# Patient Record
Sex: MALE | Race: WHITE | URBAN - METROPOLITAN AREA
[De-identification: names, ages, dates, MRNs, and addresses within clinical notes are randomized per-mention and may not be internally consistent; named-entity substitution may affect disease eponyms.]

---

## 2018-09-19 ENCOUNTER — OFFICE VISIT (OUTPATIENT)
Dept: FAMILY MEDICINE CLINIC | Age: 68
End: 2018-09-19

## 2018-09-19 ENCOUNTER — HOSPITAL ENCOUNTER (OUTPATIENT)
Dept: LAB | Age: 68
Discharge: HOME | End: 2018-09-19
Payer: MEDICARE

## 2018-09-19 VITALS
SYSTOLIC BLOOD PRESSURE: 128 MMHG | DIASTOLIC BLOOD PRESSURE: 86 MMHG | WEIGHT: 219.4 LBS | RESPIRATION RATE: 12 BRPM | BODY MASS INDEX: 33.25 KG/M2 | HEART RATE: 75 BPM | OXYGEN SATURATION: 97 % | TEMPERATURE: 97.6 F | HEIGHT: 68 IN

## 2018-09-19 DIAGNOSIS — E87.5 HYPERKALEMIA: ICD-10-CM

## 2018-09-19 DIAGNOSIS — Z79.899 HIGH RISK MEDICATION USE: ICD-10-CM

## 2018-09-19 DIAGNOSIS — L40.50 PSORIATIC ARTHRITIS (HCC): Primary | ICD-10-CM

## 2018-09-19 DIAGNOSIS — Z88.9 HISTORY OF SEASONAL ALLERGIES: ICD-10-CM

## 2018-09-19 DIAGNOSIS — G47.33 OSA ON CPAP: ICD-10-CM

## 2018-09-19 DIAGNOSIS — Z99.89 OSA ON CPAP: ICD-10-CM

## 2018-09-19 DIAGNOSIS — I48.20 CHRONIC ATRIAL FIBRILLATION (HCC): ICD-10-CM

## 2018-09-19 DIAGNOSIS — Z23 ENCOUNTER FOR IMMUNIZATION: ICD-10-CM

## 2018-09-19 DIAGNOSIS — M27.69 FAILING DENTAL IMPLANT: ICD-10-CM

## 2018-09-19 DIAGNOSIS — Z13.5 GLAUCOMA SCREENING: ICD-10-CM

## 2018-09-19 DIAGNOSIS — L40.50 PSORIATIC ARTHRITIS (HCC): ICD-10-CM

## 2018-09-19 LAB
ALBUMIN SERPL-MCNC: 4.1 G/DL (ref 3.4–5)
ALBUMIN/GLOB SERPL: 1.2 {RATIO} (ref 0.8–1.7)
ALP SERPL-CCNC: 41 U/L (ref 45–117)
ALT SERPL-CCNC: 35 U/L (ref 16–61)
ANION GAP SERPL CALC-SCNC: 8 MMOL/L (ref 3–18)
AST SERPL-CCNC: 20 U/L (ref 15–37)
BASOPHILS # BLD: 0 K/UL (ref 0–0.1)
BASOPHILS NFR BLD: 0 % (ref 0–2)
BILIRUB SERPL-MCNC: 0.5 MG/DL (ref 0.2–1)
BUN SERPL-MCNC: 21 MG/DL (ref 7–18)
BUN/CREAT SERPL: 20 (ref 12–20)
CALCIUM SERPL-MCNC: 9.3 MG/DL (ref 8.5–10.1)
CHLORIDE SERPL-SCNC: 106 MMOL/L (ref 100–108)
CO2 SERPL-SCNC: 27 MMOL/L (ref 21–32)
CREAT SERPL-MCNC: 1.04 MG/DL (ref 0.6–1.3)
DIFFERENTIAL METHOD BLD: ABNORMAL
EOSINOPHIL # BLD: 0.2 K/UL (ref 0–0.4)
EOSINOPHIL NFR BLD: 3 % (ref 0–5)
ERYTHROCYTE [DISTWIDTH] IN BLOOD BY AUTOMATED COUNT: 12.9 % (ref 11.6–14.5)
GLOBULIN SER CALC-MCNC: 3.4 G/DL (ref 2–4)
GLUCOSE SERPL-MCNC: 86 MG/DL (ref 74–99)
HCT VFR BLD AUTO: 45.1 % (ref 36–48)
HGB BLD-MCNC: 15.5 G/DL (ref 13–16)
LYMPHOCYTES # BLD: 1.8 K/UL (ref 0.9–3.6)
LYMPHOCYTES NFR BLD: 27 % (ref 21–52)
MCH RBC QN AUTO: 34.4 PG (ref 24–34)
MCHC RBC AUTO-ENTMCNC: 34.4 G/DL (ref 31–37)
MCV RBC AUTO: 100.2 FL (ref 74–97)
MONOCYTES # BLD: 1 K/UL (ref 0.05–1.2)
MONOCYTES NFR BLD: 15 % (ref 3–10)
NEUTS SEG # BLD: 3.8 K/UL (ref 1.8–8)
NEUTS SEG NFR BLD: 55 % (ref 40–73)
PLATELET # BLD AUTO: 197 K/UL (ref 135–420)
PMV BLD AUTO: 11.7 FL (ref 9.2–11.8)
POTASSIUM SERPL-SCNC: 4.6 MMOL/L (ref 3.5–5.5)
PROT SERPL-MCNC: 7.5 G/DL (ref 6.4–8.2)
RBC # BLD AUTO: 4.5 M/UL (ref 4.7–5.5)
SODIUM SERPL-SCNC: 141 MMOL/L (ref 136–145)
WBC # BLD AUTO: 6.8 K/UL (ref 4.6–13.2)

## 2018-09-19 PROCEDURE — 85025 COMPLETE CBC W/AUTO DIFF WBC: CPT | Performed by: FAMILY MEDICINE

## 2018-09-19 PROCEDURE — 80053 COMPREHEN METABOLIC PANEL: CPT | Performed by: FAMILY MEDICINE

## 2018-09-19 RX ORDER — MELOXICAM 7.5 MG/1
7.5 TABLET ORAL DAILY
COMMUNITY
Start: 2018-08-23

## 2018-09-19 RX ORDER — CHLORHEXIDINE GLUCONATE 1.2 MG/ML
RINSE BUCCAL
COMMUNITY
Start: 2018-07-12

## 2018-09-19 RX ORDER — LORATADINE 10 MG/1
10 TABLET ORAL DAILY
COMMUNITY

## 2018-09-19 RX ORDER — CARVEDILOL 12.5 MG/1
12.5 TABLET ORAL 2 TIMES DAILY WITH MEALS
COMMUNITY
Start: 2018-07-27

## 2018-09-19 RX ORDER — FLUTICASONE PROPIONATE 50 MCG
SPRAY, SUSPENSION (ML) NASAL
COMMUNITY
Start: 2018-09-08

## 2018-09-19 RX ORDER — MULTIVITAMIN
1 TABLET ORAL DAILY
COMMUNITY

## 2018-09-19 RX ORDER — DABIGATRAN ETEXILATE MESYLATE 150 MG/1
150 CAPSULE ORAL 2 TIMES DAILY
COMMUNITY
Start: 2018-08-23

## 2018-09-19 NOTE — PROGRESS NOTES
HISTORY OF PRESENT ILLNESS 
Sugey Higuera is a 74 y.o. male. HPI Comments: Mr. Saskia Finch is here to establish care because he just turned 74, got Medicare, and got kicked out of the [employer] system. He has psoriatic arthritis, a-fib and seasonal allergies. He was also seeing derm. He also had a dental implant months ago that isn't healing and the dentist asked him to get Vitamin D and calcium checked. When the rheumatologist did labs, his K+ was high, she asked him to get a renal panel when he gets a new PCP. He signed to get his records sent here. Labs Pertinent negatives include no chest pain, no abdominal pain, no headaches and no shortness of breath. Review of Systems Constitutional: Negative for chills and fever. HENT: Negative for hearing loss and sore throat. Eyes: Negative for blurred vision and double vision. Respiratory: Negative for cough and shortness of breath. Cardiovascular: Negative for chest pain and palpitations. Gastrointestinal: Negative for abdominal pain, nausea and vomiting. Genitourinary: Negative for dysuria, frequency and urgency. Musculoskeletal: Positive for myalgias. Negative for neck pain. Neurological: Negative for headaches. Psychiatric/Behavioral: Negative for depression and suicidal ideas. Physical Exam  
Constitutional: He is oriented to person, place, and time. He appears well-developed and well-nourished. Eyes: Pupils are equal, round, and reactive to light. Neck: Neck supple. No thyromegaly present. Cardiovascular: Normal rate and normal heart sounds. Pulmonary/Chest: Effort normal and breath sounds normal. No respiratory distress. He has no wheezes. He has no rales. Abdominal: Soft. He exhibits no distension. There is no tenderness. Lymphadenopathy:  
  He has no cervical adenopathy. Neurological: He is alert and oriented to person, place, and time. Psychiatric: He has a normal mood and affect. His behavior is normal.  
Nursing note and vitals reviewed. ASSESSMENT and PLAN 
  ICD-10-CM ICD-9-CM 1. Psoriatic arthritis ([facility] L40.50 696.0 CBC WITH AUTOMATED DIFF 2. Hyperkalemia S03.7 643.2 METABOLIC PANEL, COMPREHENSIVE 3. Chronic atrial fibrillation (HCC) I48.2 427.31   
4. History of seasonal allergies Z88.9 V15.09   
5. High risk medication use T42.099 X45.88 METABOLIC PANEL, COMPREHENSIVE  
   CBC WITH AUTOMATED DIFF 6. DOMINIC on CPAP G47.33 327.23   
 Z99.89 V46.8 7. Encounter for immunization Z23 V03.89 INFLUENZA VACCINE INACTIVATED (IIV), SUBUNIT, ADJUVANTED, IM  
   ADMIN INFLUENZA VIRUS VAC 8. Failing dental implant M27.69 525.79   
9. Glaucoma screening Z13.5 V80.1 REFERRAL TO OPTOMETRY

## 2018-09-19 NOTE — MR AVS SNAPSHOT
[address] [place] [address] [id number] 
[phone] Patient: Jose Medel MRN: [id number] WKN:[id number] Visit Information Date & Time Provider Department Dept. Phone Encounter #  
 9/19/2018  1:00 PM Akosua Hawk, [address] [facility] [phone] [id number] Upcoming Health Maintenance Date Due Hepatitis C Screening 1950 DTaP/Tdap/Td series (1 - Tdap) 9/30/1973 FOBT Q 1 YEAR AGE 50-75 9/30/2002 ZOSTER VACCINE AGE 60> 7/30/2012 GLAUCOMA SCREENING Q2Y 9/30/2017 Pneumococcal 65+ Low/Medium Risk (1 of 2 - PCV13) 9/30/2017 Influenza Age 5 to Adult 8/1/2018 Allergies as of 9/19/2018  Review Complete On: 9/19/2018 By: Akosua Hawk MD  
 No Known Allergies Current Immunizations  Never Reviewed Name Date Influenza Vaccine (Tri) Adjuvanted 9/19/2018 Not reviewed this visit You Were Diagnosed With   
  
 Codes Comments Psoriatic arthritis ([facility]    -  Primary ICD-10-CM: L40.50 ICD-9-CM: 696.0 Hyperkalemia     ICD-10-CM: E87.5 ICD-9-CM: 276.7 Chronic atrial fibrillation (HCC)     ICD-10-CM: Q37.9 ICD-9-CM: 427.31 History of seasonal allergies     ICD-10-CM: Z88.9 ICD-9-CM: V15.09 High risk medication use     ICD-10-CM: Z79.899 ICD-9-CM: V58.69 DOMINIC on CPAP     ICD-10-CM: G47.33, Z99.89 ICD-9-CM: 327.23, V46.8 Encounter for immunization     ICD-10-CM: B05 ICD-9-CM: V03.89 Failing dental implant     ICD-10-CM: M27.69 ICD-9-CM: 525.79 Vitals BP Pulse Temp Resp Height(growth percentile) Weight(growth percentile) 128/86 (BP 1 Location: Right arm, BP Patient Position: Sitting) 75 97.6 °F (36.4 °C) (Oral) 12 5' 8\" (1.727 m) 219 lb 6.4 oz (99.5 kg) SpO2 BMI Smoking Status 97% 33.36 kg/m2 Never Smoker BMI and BSA Data Body Mass Index Body Surface Area  
 33.36 kg/m 2 2.18 m 2 Preferred Pharmacy Pharmacy Name Phone [facility] [phone] Your Updated Medication List  
  
   
This list is accurate as of 9/19/18  1:31 PM.  Always use your most recent med list.  
  
  
  
  
 adalimumab 40 mg/0.8 mL injection pen Commonly known as:  HUMIRA  
by SubCUTAneous route once. calcium-cholecalciferol (D3) tablet Commonly known as:  CALTRATE 600+D Take 1 Tab by mouth daily. carvedilol 12.5 mg tablet Commonly known as:  COREG  
  
 CLARITIN 10 mg tablet Generic drug:  loratadine Take 10 mg by mouth. CLOBETASOL PROPIONATE (BULK)  
by Does Not Apply route. fluticasone 50 mcg/actuation nasal spray Commonly known as:  FLONASE  
  
 meloxicam 7.5 mg tablet Commonly known as:  MOBIC PERIOGARD 0.12 % solution Generic drug:  chlorhexidine PRADAXA 150 mg capsule Generic drug:  dabigatran etexilate We Performed the Following ADMIN INFLUENZA VIRUS VAC [ [facility]] INFLUENZA VACCINE INACTIVATED (IIV), SUBUNIT, ADJUVANTED, IM [id number] CPT(R)] Patient Instructions Follow up on lab results in 1-2 days. If you sign up for \"My Chart\" you will be able to see the results online, and if you have any questions you can send me an e-mail. Schedule a \"Welcome to Medicare Exam\" and get together dates of your vaccines, colonoscopies, and if you have an Advanced Medical Directive (living will), bring that in. Introducing [facility]! Nehemias Minaya introduces [employer] patient portal. Now you can access parts of your medical record, email your doctor's office, and request medication refills online. 1. In your internet browser, go to [email or web address] 2. Click on the First Time User? Click Here link in the Sign In box. You will see the New Member Sign Up page. 3. Enter your [employer] Access Code exactly as it appears below.  You will not need to use this code after youve completed the sign-up process. If you do not sign up before the expiration date, you must request a new code. · [email or web address] Access Code: [email or web address] Expires: 12/18/2018  1:31 PM 
 
4. Enter the last four digits of your Social Security Number (xxxx) and Date of Birth (mm/dd/yyyy) as indicated and click Submit. You will be taken to the next sign-up page. 5. Create a [employer] ID. This will be your [employer] login ID and cannot be changed, so think of one that is secure and easy to remember. 6. Create a [employer] password. You can change your password at any time. 7. Enter your Password Reset Question and Answer. This can be used at a later time if you forget your password. 8. Enter your e-mail address. You will receive e-mail notification when new information is available in [address]. 9. Click Sign Up. You can now view and download portions of your medical record. 10. Click the Download Summary menu link to download a portable copy of your medical information. If you have questions, please visit the Frequently Asked Questions section of the [employer] website. Remember, [employer] is NOT to be used for urgent needs. For medical emergencies, dial 911. Now available from your iPhone and Android! Please provide this summary of care documentation to your next provider. If you have any questions after today's visit, please call [phone].

## 2018-09-19 NOTE — PROGRESS NOTES
Rm 1 
Patient presents to the clinic for a renal panel lab work due to having a low potassium level a couple months ago and to establish care. Depression Screening: PHQ over the last two weeks 9/19/2018 Little interest or pleasure in doing things Not at all Feeling down, depressed, irritable, or hopeless Not at all Total Score PHQ 2 0 Learning Assessment: 
Learning Assessment 9/19/2018 PRIMARY LEARNER Patient HIGHEST LEVEL OF EDUCATION - PRIMARY LEARNER  4 YEARS OF COLLEGE  
BARRIERS PRIMARY LEARNER NONE  
CO-LEARNER CAREGIVER No  
PRIMARY LANGUAGE ENGLISH  
LEARNER PREFERENCE PRIMARY DEMONSTRATION  
ANSWERED BY pTIENT  
RELATIONSHIP SELF Abuse Screening: 
Abuse Screening Questionnaire 9/19/2018 Do you ever feel afraid of your partner? Vik Gonsalez Are you in a relationship with someone who physically or mentally threatens you? Vik Gonsalez Is it safe for you to go home? Melchor Pacheco Health Maintenance reviewed and discussed per provider: yes Coordination of Care: 1. Have you been to the ER, urgent care clinic since your last visit? Hospitalized since your last visit? no 
 
2. Have you seen or consulted any other health care providers outside of the [address] [place] [address] since your last visit? Include any pap smears or colon screening. no 
 
 
 
 
Patient received influenza vaccine 0.5ml in left deltoid. Tolerated well. No signs or symptoms of distress noted. Most current VIS given and consent signed.

## 2018-09-21 ENCOUNTER — TELEPHONE (OUTPATIENT)
Dept: FAMILY MEDICINE CLINIC | Age: 68
End: 2018-09-21

## 2018-09-21 NOTE — PROGRESS NOTES
Patient called back, let him know his lab results were in and looks like everything came back great. Patient verbalized understanding and had no further questions.

## 2018-09-21 NOTE — TELEPHONE ENCOUNTER
Patient called, verified name and  to return phone call. Let patient know we received his lab work and it looks like everything came back great. Patient verbalized understanding and had no further questions.

## 2018-10-24 ENCOUNTER — OFFICE VISIT (OUTPATIENT)
Dept: FAMILY MEDICINE CLINIC | Age: 68
End: 2018-10-24

## 2018-10-24 VITALS
DIASTOLIC BLOOD PRESSURE: 84 MMHG | TEMPERATURE: 97.9 F | HEART RATE: 76 BPM | SYSTOLIC BLOOD PRESSURE: 125 MMHG | WEIGHT: 219.6 LBS | BODY MASS INDEX: 33.28 KG/M2 | OXYGEN SATURATION: 98 % | HEIGHT: 68 IN | RESPIRATION RATE: 16 BRPM

## 2018-10-24 DIAGNOSIS — Z23 ENCOUNTER FOR IMMUNIZATION: ICD-10-CM

## 2018-10-24 DIAGNOSIS — Z00.00 INITIAL MEDICARE ANNUAL WELLNESS VISIT: Primary | ICD-10-CM

## 2018-10-24 DIAGNOSIS — Z13.31 SCREENING FOR DEPRESSION: ICD-10-CM

## 2018-10-24 DIAGNOSIS — Z13.39 SCREENING FOR ALCOHOLISM: ICD-10-CM

## 2018-10-24 DIAGNOSIS — Z71.89 ADVANCED CARE PLANNING/COUNSELING DISCUSSION: ICD-10-CM

## 2018-10-24 NOTE — PROGRESS NOTES
This is an Initial Medicare Annual Wellness Exam (AWV) (Performed 12 months after IPPE or effective date of Medicare Part B enrollment, Once in a lifetime) I have reviewed the patient's medical history in detail and updated the computerized patient record. History Past Medical History:  
Diagnosis Date  Atrial fibrillation ([facility] 2001  Psoriatic arthritis ([facility]  Sleep apnea 2001 No past surgical history on file. Current Outpatient Medications Medication Sig Dispense Refill  carvedilol (COREG) 12.5 mg tablet  fluticasone (FLONASE) 50 mcg/actuation nasal spray  meloxicam (MOBIC) 7.5 mg tablet  PERIOGARD 0.12 % solution  PRADAXA 150 mg capsule  adalimumab (HUMIRA) 40 mg/0.8 mL injection pen by SubCUTAneous route once.  loratadine (CLARITIN) 10 mg tablet Take 10 mg by mouth.  calcium-cholecalciferol, D3, (CALTRATE 600+D) tablet Take 1 Tab by mouth daily.  CLOBETASOL PROPIONATE, BULK, by Does Not Apply route. No Known Allergies Family History Problem Relation Age of Onset  No Known Problems Mother  No Known Problems Father Social History Tobacco Use  Smoking status: Never Smoker  Smokeless tobacco: Never Used Substance Use Topics  Alcohol use: No  
 
Patient Active Problem List  
Diagnosis Code  Psoriatic arthritis ([facility] L40.50  Chronic atrial fibrillation (HCC) I48.2  DOMINIC on CPAP G47.33, Z99.89 Depression Risk Factor Screening: PHQ over the last two weeks 10/24/2018 Little interest or pleasure in doing things Not at all Feeling down, depressed, irritable, or hopeless Not at all Total Score PHQ 2 0 Alcohol Risk Factor Screening: You do not drink alcohol or very rarely. Functional Ability and Level of Safety:  
 
Hearing Loss Hearing is good. Activities of Daily Living The home contains: handrails and grab bars Patient does total self care Fall Risk Fall Risk Assessment, last 12 mths 9/19/2018 Able to walk? Yes Fall in past 12 months? No  
 
 
Abuse Screen Patient is not abused Cognitive Screening Evaluation of Cognitive Function: 
Has your family/caregiver stated any concerns about your memory: no 
Normal 
 
Patient Care Team  
No care team member to display Assessment/Plan Education and counseling provided: 
End-of-Life planning (with patient's consent) Diagnoses and all orders for this visit: 
 
1. Initial Medicare annual wellness visit 2. Screening for alcoholism -     [place] ANNUAL ALCOHOL SCREEN 15 MIN 3. Screening for depression 
-     Jean  Health Maintenance Due Topic Date Due  
 Hepatitis C Screening  1950  DTaP/Tdap/Td series (1 - Tdap) 09/30/1973  Shingrix Vaccine Age 50> (1 of 2) 09/30/2002  FOBT Q 1 YEAR AGE 50-75  09/30/2002  GLAUCOMA SCREENING Q2Y  09/30/2017  Pneumococcal 65+ Low/Medium Risk (1 of 2 - PCV13) 09/30/2017  MEDICARE YEARLY EXAM  09/19/2018 His last colonoscopy was about 3 years ago, he's on the 5 year plan

## 2018-10-24 NOTE — PATIENT INSTRUCTIONS
Medicare Wellness Visit, Male The best way to live healthy is to have a lifestyle where you eat a well-balanced diet, exercise regularly, limit alcohol use, and quit all forms of tobacco/nicotine, if applicable. Regular preventive services are another way to keep healthy. Preventive services (vaccines, screening tests, monitoring & exams) can help personalize your care plan, which helps you manage your own care. Screening tests can find health problems at the earliest stages, when they are easiest to treat. [address] [facility] follows the current, evidence-based guidelines published by the [facility] ([facility]) when recommending preventive services for our patients. Because we follow these guidelines, sometimes recommendations change over time as research supports it. (For example, a prostate screening blood test is no longer routinely recommended for men with no symptoms.) Of course, you and your doctor may decide to screen more often for some diseases, based on your risk and co-morbidities (chronic disease you are already diagnosed with). Preventive services for you include: - Medicare offers their members a free annual wellness visit, which is time for you and your primary care provider to discuss and plan for your preventive service needs. Take advantage of this benefit every year! 
-All adults over age 72 should receive the recommended pneumonia vaccines. Current USPSTF guidelines recommend a series of two vaccines for the best pneumonia protection.  
-All adults should have a flu vaccine yearly and an ECG.  All adults age 61 and older should receive a shingles vaccine once in their lifetime.   
-All adults age 38-68 who are overweight should have a diabetes screening test once every three years.  
-Other screening tests & preventive services for persons with diabetes include: an eye exam to screen for diabetic retinopathy, a kidney function test, a foot exam, and stricter control over your cholesterol.  
-Cardiovascular screening for adults with routine risk involves an electrocardiogram (ECG) at intervals determined by the provider.  
-Colorectal cancer screening should be done for adults age 54-65 with no increased risk factors for colorectal cancer. There are a number of acceptable methods of screening for this type of cancer. Each test has its own benefits and drawbacks. Discuss with your provider what is most appropriate for you during your annual wellness visit. The different tests include: colonoscopy (considered the best screening method), a fecal occult blood test, a fecal DNA test, and sigmoidoscopy. 
-All adults born between [facility] should be screened once for Hepatitis C. 
-An Abdominal Aortic Aneurysm (AAA) Screening is recommended for men age 73-68 who has ever smoked in their lifetime. Here is a list of your current Health Maintenance items (your personalized list of preventive services) with a due date: 
Health Maintenance Due Topic Date Due  
 Hepatitis C Test  1950  DTaP/Tdap/Td  (1 - Tdap) 09/30/1973  Shingles Vaccine (1 of 2) 09/30/2002  Stool testing for trace blood  09/30/2002  Glaucoma Screening   09/30/2017  Pneumococcal Vaccine (1 of 2 - PCV13) 09/30/2017 [place] Annual Well Visit  09/19/2018 Go through your stack of records and try to find the date of the colonoscopy for me, and if you can the hepatitis C screen.

## 2018-10-24 NOTE — PROGRESS NOTES
Presented for Tdap and Pneumococcal Vaccine. Administered in left and right deltoid without complaints. Pt observed for 5 min. No adverse reaction noted. Pt left office in stable condition

## 2018-10-24 NOTE — ACP (ADVANCE CARE PLANNING)
Discussed the importance of having an Advanced Care plan in writing (and in the electronic medical record), and the time to do it is now, when one is able to make the necessary decisions. A copy of the Advance Medical Directive booklet was given to the patient.       He said this is something he has been meaning to get done, will try to before next visit

## 2018-10-24 NOTE — PROGRESS NOTES
Rm 2 
Patient presents to the clinic Chief Complaint Patient presents with  Welcome To Medicare Depression Screening: PHQ over the last two weeks 10/24/2018 9/19/2018 Little interest or pleasure in doing things Not at all Not at all Feeling down, depressed, irritable, or hopeless Not at all Not at all Total Score PHQ 2 0 0 Learning Assessment: 
Learning Assessment 9/19/2018 PRIMARY LEARNER Patient HIGHEST LEVEL OF EDUCATION - PRIMARY LEARNER  4 YEARS OF COLLEGE  
BARRIERS PRIMARY LEARNER NONE  
CO-LEARNER CAREGIVER No  
PRIMARY LANGUAGE ENGLISH  
LEARNER PREFERENCE PRIMARY DEMONSTRATION  
ANSWERED BY pTIENT  
RELATIONSHIP SELF Abuse Screening: 
Abuse Screening Questionnaire 9/19/2018 Do you ever feel afraid of your partner? Henretta Expose Are you in a relationship with someone who physically or mentally threatens you? Henretta Expose Is it safe for you to go home? Siddhartha Dakins Health Maintenance reviewed and discussed per provider: yes Coordination of Care: 1. Have you been to the ER, urgent care clinic since your last visit? Hospitalized since your last visit? no 
 
2. Have you seen or consulted any other health care providers outside of the [facility] since your last visit? Include any pap smears or colon screening.  no

## 2019-01-03 ENCOUNTER — OFFICE VISIT (OUTPATIENT)
Dept: FAMILY MEDICINE CLINIC | Age: 69
End: 2019-01-03

## 2019-01-03 ENCOUNTER — HOSPITAL ENCOUNTER (OUTPATIENT)
Dept: LAB | Age: 69
Discharge: HOME | End: 2019-01-03
Payer: MEDICARE

## 2019-01-03 VITALS
HEART RATE: 86 BPM | SYSTOLIC BLOOD PRESSURE: 149 MMHG | TEMPERATURE: 98.2 F | RESPIRATION RATE: 14 BRPM | WEIGHT: 217 LBS | HEIGHT: 68 IN | OXYGEN SATURATION: 96 % | BODY MASS INDEX: 32.89 KG/M2 | DIASTOLIC BLOOD PRESSURE: 92 MMHG

## 2019-01-03 DIAGNOSIS — R03.0 ELEVATED BP WITHOUT DIAGNOSIS OF HYPERTENSION: ICD-10-CM

## 2019-01-03 DIAGNOSIS — Z87.448 HISTORY OF HEMATURIA: ICD-10-CM

## 2019-01-03 DIAGNOSIS — R52 BODY ACHES: Primary | ICD-10-CM

## 2019-01-03 LAB
ANION GAP SERPL CALC-SCNC: 6 MMOL/L (ref 3–18)
BILIRUB UR QL STRIP: NEGATIVE
BUN SERPL-MCNC: 17 MG/DL (ref 7–18)
BUN/CREAT SERPL: 18 (ref 12–20)
CALCIUM SERPL-MCNC: 9.4 MG/DL (ref 8.5–10.1)
CHLORIDE SERPL-SCNC: 107 MMOL/L (ref 100–108)
CO2 SERPL-SCNC: 28 MMOL/L (ref 21–32)
CREAT SERPL-MCNC: 0.97 MG/DL (ref 0.6–1.3)
GLUCOSE SERPL-MCNC: 89 MG/DL (ref 74–99)
GLUCOSE UR-MCNC: NEGATIVE MG/DL
KETONES P FAST UR STRIP-MCNC: NEGATIVE MG/DL
PH UR STRIP: 5.5 [PH] (ref 4.6–8)
POTASSIUM SERPL-SCNC: 4.7 MMOL/L (ref 3.5–5.5)
PROT UR QL STRIP: NEGATIVE
SODIUM SERPL-SCNC: 141 MMOL/L (ref 136–145)
SP GR UR STRIP: 1.02 (ref 1–1.03)
UA UROBILINOGEN AMB POC: NORMAL (ref 0.2–1)
URINALYSIS CLARITY POC: CLEAR
URINALYSIS COLOR POC: YELLOW
URINE BLOOD POC: NEGATIVE
URINE LEUKOCYTES POC: NEGATIVE
URINE NITRITES POC: NEGATIVE

## 2019-01-03 PROCEDURE — 80048 BASIC METABOLIC PNL TOTAL CA: CPT

## 2019-01-03 NOTE — PROGRESS NOTES
HISTORY OF PRESENT ILLNESS 
Tacho Glass is a 79 y.o. male. Four days ago Mr. Rolando Deleon urinated and says it was bright red. He was not having any pain anywhere, frequency, dysuria. The next day it looked fine. He has had some body aches and felt run down for a couple of days. NO  History of kidney stones. Review of Systems Constitutional: Negative for chills and fever. Eyes: Negative for blurred vision and double vision. Respiratory: Negative for cough and shortness of breath. Cardiovascular: Negative for chest pain and palpitations. Gastrointestinal: Negative for abdominal pain, nausea and vomiting. Genitourinary: Positive for hematuria. Negative for dysuria, flank pain, frequency and urgency. Musculoskeletal: Positive for myalgias. Negative for back pain. Neurological: Negative for headaches. Physical Exam  
Constitutional: He appears well-developed and well-nourished. HENT:  
Right Ear: Tympanic membrane, external ear and ear canal normal.  
Left Ear: Tympanic membrane, external ear and ear canal normal.  
Nose: Nose normal.  
Mouth/Throat: Uvula is midline, oropharynx is clear and moist and mucous membranes are normal. No posterior oropharyngeal erythema. Eyes: Conjunctivae are normal. Pupils are equal, round, and reactive to light. Right conjunctiva is not injected. Left conjunctiva is not injected. Neck: Neck supple. No thyromegaly present. Cardiovascular: Normal rate and regular rhythm. Pulmonary/Chest: Effort normal and breath sounds normal. No respiratory distress. He has no wheezes. He has no rales. Abdominal: He exhibits no distension. There is no tenderness. There is no rebound. Musculoskeletal: He exhibits no tenderness. Lymphadenopathy:  
  He has no cervical adenopathy. Skin: No rash noted. Psychiatric: He has a normal mood and affect. Nursing note and vitals reviewed. Results for orders placed or performed in visit on 01/03/19 AMB POC URINALYSIS DIP STICK AUTO W/O MICRO Result Value Ref Range Color (UA POC) Yellow Clarity (UA POC) Clear Glucose (UA POC) Negative Negative Bilirubin (UA POC) Negative Negative Ketones (UA POC) Negative Negative Specific gravity (UA POC) 1.020 1.001 - 1.035 Blood (UA POC) Negative Negative pH (UA POC) 5.5 4.6 - 8.0 Protein (UA POC) Negative Negative Urobilinogen (UA POC) 0.2 mg/dL 0.2 - 1 Nitrites (UA POC) Negative Negative Leukocyte esterase (UA POC) Negative Negative ASSESSMENT and PLAN 
  ICD-10-CM ICD-9-CM 1. Body aches R52 780.96   
2. Elevated BP without diagnosis of hypertension L98.8 863.7 METABOLIC PANEL, BASIC 3. History of hematuria Z87.448 V13.09 AMB POC URINALYSIS DIP STICK AUTO W/O MICRO  
   METABOLIC PANEL, BASIC Will monitor urine.

## 2019-01-03 NOTE — PATIENT INSTRUCTIONS
No treatment needed. Recheck if symptoms recur. I will send you an e-mail with any recommendations about the lab results. Monitor BP-find a pharmacy with a machine and check once or twice weekly. Record the readings and recheck if it isn't consistently below 140/90

## 2019-01-03 NOTE — PROGRESS NOTES
Patient to the clinic for blood in his urine that appeared 4 days ago. Patient complains of body aches, lethargic, urinary frequency.

## 2019-03-18 ENCOUNTER — OFFICE VISIT (OUTPATIENT)
Dept: INTERNAL MEDICINE CLINIC | Age: 69
End: 2019-03-18

## 2019-03-18 VITALS
BODY MASS INDEX: 33.65 KG/M2 | RESPIRATION RATE: 14 BRPM | HEART RATE: 79 BPM | HEIGHT: 68 IN | SYSTOLIC BLOOD PRESSURE: 115 MMHG | WEIGHT: 222 LBS | OXYGEN SATURATION: 97 % | DIASTOLIC BLOOD PRESSURE: 77 MMHG | TEMPERATURE: 97.7 F

## 2019-03-18 DIAGNOSIS — Z01.818 PREOPERATIVE CLEARANCE: ICD-10-CM

## 2019-03-18 DIAGNOSIS — H26.9 CATARACT OF LEFT EYE, UNSPECIFIED CATARACT TYPE: Primary | ICD-10-CM

## 2019-03-18 NOTE — PROGRESS NOTES
Patient presents to the clinic for a pre op exam. Patient is scheduled to have left eye cataract removal on 04/15/19 with Dr. Brenda Mazariegos.

## 2019-03-18 NOTE — PROGRESS NOTES
HISTORY OF PRESENT ILLNESS  Wiliam Dong is a 79 y.o. male. HPI  Mr. Adolfo Alvarado presents for pre-operative exam for upcoming left eye cataract removal on 4/15/19 with Dr. Vince Roberts. He c/o left eye decreased vision. He reports he was not advised to change or hold any of his medications prior to surgery. Past Medical History:   Diagnosis Date    Atrial fibrillation ([facility] 2001    Psoriatic arthritis ([facility]     Sleep apnea 2001     Current Outpatient Medications   Medication Sig Dispense Refill    multivit-min-FA-lycopen-lutein (CENTRUM SILVER MEN) 300-600-300 mcg tab Take  by mouth.  carvedilol (COREG) 12.5 mg tablet Take 12.5 mg by mouth two (2) times daily (with meals).  fluticasone (FLONASE) 50 mcg/actuation nasal spray       meloxicam (MOBIC) 7.5 mg tablet Take 7.5 mg by mouth daily.  PERIOGARD 0.12 % solution       PRADAXA 150 mg capsule Take 150 mg by mouth two (2) times a day.  adalimumab (HUMIRA) 40 mg/0.8 mL injection pen by SubCUTAneous route once.  loratadine (CLARITIN) 10 mg tablet Take 10 mg by mouth daily.  calcium-cholecalciferol, D3, (CALTRATE 600+D) tablet Take 1 Tab by mouth daily.  CLOBETASOL PROPIONATE, BULK, by Does Not Apply route. Review of Systems   Constitutional: Negative for malaise/fatigue. HENT: Negative for congestion. Respiratory: Negative for shortness of breath. Cardiovascular: Negative for chest pain, palpitations and leg swelling. Gastrointestinal: Negative for blood in stool, diarrhea, nausea and vomiting. Genitourinary: Negative for hematuria. Neurological: Negative for dizziness and headaches. Visit Vitals  /77 (BP 1 Location: Left arm, BP Patient Position: Sitting)   Pulse 79   Temp 97.7 °F (36.5 °C) (Oral)   Resp 14   Ht 5' 8\" (1.727 m)   Wt 222 lb (100.7 kg)   SpO2 97%   BMI 33.75 kg/m²       Physical Exam   Constitutional: He is oriented to person, place, and time.  He appears well-developed and well-nourished. No distress. HENT:   Head: Normocephalic and atraumatic. Right Ear: Tympanic membrane, external ear and ear canal normal.   Left Ear: Tympanic membrane, external ear and ear canal normal.   Nose: Nose normal.   Mouth/Throat: Uvula is midline, oropharynx is clear and moist and mucous membranes are normal. No oropharyngeal exudate, posterior oropharyngeal edema, posterior oropharyngeal erythema or tonsillar abscesses. Eyes: Conjunctivae are normal. Pupils are equal, round, and reactive to light. No scleral icterus. Neck: Neck supple. Carotid bruit is not present. No thyroid mass and no thyromegaly present. Cardiovascular: Normal rate, regular rhythm and normal heart sounds. Exam reveals no gallop. No murmur heard. Pulses:       Dorsalis pedis pulses are 2+ on the right side, and 2+ on the left side. Posterior tibial pulses are 2+ on the right side, and 2+ on the left side. No pedal edema. Pulmonary/Chest: Effort normal and breath sounds normal. No respiratory distress. He has no decreased breath sounds. He has no wheezes. He has no rhonchi. He has no rales. Lymphadenopathy:        Head (right side): No submandibular and no tonsillar adenopathy present. Head (left side): No submandibular and no tonsillar adenopathy present. He has no cervical adenopathy. Right: No supraclavicular adenopathy present. Left: No supraclavicular adenopathy present. Neurological: He is alert and oriented to person, place, and time. Skin: Skin is warm and dry. Psychiatric: He has a normal mood and affect. His speech is normal.       ASSESSMENT and PLAN  Diagnoses and all orders for this visit:    1. Cataract of left eye, unspecified cataract type  2. Preoperative clearance  - Advised patient to hold Mobic 1 week prior to surgery. - Advised patient to follow recommendations per Dr. Lior Fontana regarding Pradaxa.  Discussed that we want to minimize the time he is off this medication. Patient states he believes he can continue this through his surgery. He will confirm, however, prior to surgery. - Awaiting pre-op paperwork. Will complete when received. - Clearance approved.

## 2019-07-26 ENCOUNTER — OFFICE VISIT (OUTPATIENT)
Dept: INTERNAL MEDICINE CLINIC | Age: 69
End: 2019-07-26

## 2019-07-26 VITALS
TEMPERATURE: 96.2 F | SYSTOLIC BLOOD PRESSURE: 121 MMHG | HEIGHT: 68 IN | RESPIRATION RATE: 18 BRPM | DIASTOLIC BLOOD PRESSURE: 83 MMHG | BODY MASS INDEX: 33.34 KG/M2 | OXYGEN SATURATION: 97 % | HEART RATE: 74 BPM | WEIGHT: 220 LBS

## 2019-07-26 DIAGNOSIS — Z01.818 PREOPERATIVE GENERAL PHYSICAL EXAMINATION: Primary | ICD-10-CM

## 2019-07-26 NOTE — PROGRESS NOTES
Rm: 16    Chief Complaint   Patient presents with    Pre-op Exam     eye surgery 8/1/19     Depression Screening:  3 most recent [facility] [place] Screens 7/26/2019 3/18/2019 1/3/2019 10/24/2018 9/19/2018   Little interest or pleasure in doing things Not at all Not at all Not at all Not at all Not at all   Feeling down, depressed, irritable, or hopeless Not at all Not at all Not at all Not at all Not at all   Total Score PHQ 2 0 0 0 0 0       Learning Assessment:  Learning Assessment 9/19/2018   PRIMARY LEARNER Patient   HIGHEST LEVEL OF EDUCATION - PRIMARY LEARNER  4 YEARS [place] PRIMARY LEARNER NONE   CO-LEARNER CAREGIVER No   PRIMARY LANGUAGE ENGLISH   LEARNER PREFERENCE PRIMARY DEMONSTRATION   ANSWERED BY pTIENT   RELATIONSHIP SELF       Abuse Screening:  Abuse Screening Questionnaire 9/19/2018   Do you ever feel afraid of your partner? N   Are you in a relationship with someone who physically or mentally threatens you? N   Is it safe for you to go home? Y       Health Maintenance reviewed and discussed per provider: yes     Coordination of Care:    1. Have you been to the ER, urgent care clinic since your last visit? Hospitalized since your last visit? no    2. Have you seen or consulted any other health care providers outside of the [address] [place] [address] since your last visit? Include any pap smears or colon screening.  no

## 2019-07-26 NOTE — PROGRESS NOTES
Preoperative Evaluation    Date of Exam: 2019    Gunner Perales is a 79 y.o. male (:1950) who presents for preoperative evaluation for cataract surgery. No complications from surgery in MAR. RCRI=0. Functional status > 4 METs. No CP, SOB. Followed by cardiology for A fib. Latex Allergy: no    Problem List:     Patient Active Problem List    Diagnosis Date Noted    Psoriatic arthritis ([facility] 2018    Chronic atrial fibrillation ([facility] 2018    DOMINIC on CPAP 2018     Medical History:     Past Medical History:   Diagnosis Date    Atrial fibrillation ([facility]     Psoriatic arthritis ([facility]     Sleep apnea      Allergies:   No Known Allergies   Medications:     Current Outpatient Medications   Medication Sig    multivit-min-FA-lycopen-lutein (CENTRUM SILVER MEN) 300-600-300 mcg tab Take  by mouth.  carvedilol (COREG) 12.5 mg tablet Take 12.5 mg by mouth two (2) times daily (with meals).  fluticasone (FLONASE) 50 mcg/actuation nasal spray     meloxicam (MOBIC) 7.5 mg tablet Take 7.5 mg by mouth daily.  PERIOGARD 0.12 % solution     PRADAXA 150 mg capsule Take 150 mg by mouth two (2) times a day.  adalimumab (HUMIRA) 40 mg/0.8 mL injection pen by SubCUTAneous route once.  loratadine (CLARITIN) 10 mg tablet Take 10 mg by mouth daily.  calcium-cholecalciferol, D3, (CALTRATE 600+D) tablet Take 1 Tab by mouth daily.  CLOBETASOL PROPIONATE, BULK, by Does Not Apply route. No current facility-administered medications for this visit. Surgical History:   History reviewed. No pertinent surgical history.   Social History:     Social History     Socioeconomic History    Marital status:      Spouse name: Not on file    Number of children: Not on file    Years of education: Not on file    Highest education level: Not on file   Tobacco Use    Smoking status: Never Smoker    Smokeless tobacco: Never Used   Substance and Sexual Activity    Alcohol use: No    Drug use: Yes     Comment: rarely       Anesthesia Complications: None  History of abnormal bleeding : None  History of Blood Transfusions: no  Health Care Directive or Living Will: no    Objective:     ROS:    Feeling well. No dyspnea or chest pain on exertion. No abdominal pain, change in bowel habits, black or bloody stools. No neurological complaints. OBJECTIVE:   The patient appears well, alert, oriented x 3, in no distress. Visit Vitals  /83 (BP 1 Location: Right arm, BP Patient Position: Sitting)   Pulse 74   Temp 96.2 °F (35.7 °C) (Oral)   Resp 18   Ht 5' 8\" (1.727 m)   Wt 220 lb (99.8 kg)   SpO2 97%   BMI 33.45 kg/m²     ENT normal.  Neck supple. No adenopathy or thyromegaly. SOFIA. Lungs are clear, good air entry, no wheezes, rhonchi or rales. Cardiovascular:S1 and S2 normal, no murmurs, regular rate and rhythm. Abdomen is soft without tenderness, guarding, mass or organomegaly. Extremities show no edema, normal peripheral pulses. Neurological is normal without focal findings.        DIAGNOSTICS:   1. Labs:   Lab Results   Component Value Date/Time    WBC 6.8 09/19/2018 01:33 PM    HGB 15.5 09/19/2018 01:33 PM    HCT 45.1 09/19/2018 01:33 PM    PLATELET 785 04/93/8945 01:33 PM    .2 (H) 09/19/2018 01:33 PM     Lab Results   Component Value Date/Time    Glucose 89 01/03/2019 12:15 PM    Creatinine 0.97 01/03/2019 12:15 PM      No results found for: CHOL, CHOLPOCT, HDL, LDL, LDLC, LDLCPOC, LDLCEXT, TRIGL, TGLPOCT, CHHD, CHHDX  Lab Results   Component Value Date/Time    GFR est non-AA >60 01/03/2019 12:15 PM    GFR est AA >60 01/03/2019 12:15 PM    Creatinine 0.97 01/03/2019 12:15 PM    BUN 17 01/03/2019 12:15 PM    Sodium 141 01/03/2019 12:15 PM    Potassium 4.7 01/03/2019 12:15 PM    Chloride 107 01/03/2019 12:15 PM    CO2 28 01/03/2019 12:15 PM     No results found for: PSA, Elizabeth Mura, PSAR3, [id number], [id number], PSALT  No results found for: BNP, BNPP, [place], XBNPT, BNPNT     IMPRESSION:   Low risk for planned surgery  No contraindications to planned surgery    Sadia Montoya MD   7/26/2019

## 2024-09-05 NOTE — PATIENT INSTRUCTIONS
Follow up on lab results in 1-2 days. If you sign up for \"My Chart\" you will be able to see the results online, and if you have any questions you can send me an e-mail. Schedule a \"Welcome to Medicare Exam\" and get together dates of your vaccines, colonoscopies, and if you have an Advanced Medical Directive (living will), bring that in. [FreeTextEntry1] : See assessment.